# Patient Record
Sex: FEMALE | Race: WHITE | NOT HISPANIC OR LATINO | URBAN - METROPOLITAN AREA
[De-identification: names, ages, dates, MRNs, and addresses within clinical notes are randomized per-mention and may not be internally consistent; named-entity substitution may affect disease eponyms.]

---

## 2022-02-18 ENCOUNTER — OFFICE VISIT (OUTPATIENT)
Dept: INTERNAL MEDICINE | Facility: CLINIC | Age: 42
End: 2022-02-18

## 2022-02-18 VITALS
HEART RATE: 67 BPM | HEIGHT: 64 IN | BODY MASS INDEX: 20.96 KG/M2 | SYSTOLIC BLOOD PRESSURE: 111 MMHG | OXYGEN SATURATION: 99 % | WEIGHT: 122.8 LBS | TEMPERATURE: 99.6 F | DIASTOLIC BLOOD PRESSURE: 64 MMHG

## 2022-02-18 DIAGNOSIS — Z13.220 SCREENING, LIPID: ICD-10-CM

## 2022-02-18 DIAGNOSIS — Z00.00 ANNUAL PHYSICAL EXAM: Primary | ICD-10-CM

## 2022-02-18 DIAGNOSIS — K64.8 OTHER HEMORRHOIDS: ICD-10-CM

## 2022-02-18 DIAGNOSIS — E89.0 H/O PARTIAL THYROIDECTOMY: ICD-10-CM

## 2022-02-18 DIAGNOSIS — F41.9 ANXIETY: ICD-10-CM

## 2022-02-18 DIAGNOSIS — Z76.89 ENCOUNTER TO ESTABLISH CARE: ICD-10-CM

## 2022-02-18 DIAGNOSIS — G47.9 TROUBLE IN SLEEPING: ICD-10-CM

## 2022-02-18 PROCEDURE — 99386 PREV VISIT NEW AGE 40-64: CPT | Performed by: NURSE PRACTITIONER

## 2022-02-18 RX ORDER — MULTIPLE VITAMINS W/ MINERALS TAB 9MG-400MCG
1 TAB ORAL DAILY
COMMUNITY

## 2022-02-18 NOTE — PROGRESS NOTES
Date of Encounter: 2022  Patient: Ania Shea,  1980    Subjective     Chief complaint: Establish care, annual exam    HPI   Patient moved here recently from [place].  Patient states that she saw a provider in [place] 1 time but never went back.  Patient states that she has a history of partial thyroid removal.  Patient here today to establish care and have her annual physical.      Patient has made an OB/GYN appointment for her Pap smear with [facility] in .      Patient states that she has had some trouble sleeping through the night.  Has no trouble going to sleep but wakes up a couple times in the night.  Patient states she has a good sleep hygiene and tries to go to sleep at the same time every night.      Patient states she has a history of anxiety.  States it has been improved since moving here.  Currently not taking any medications for mood.      Review of Systems:  Negative for fever, congestion, chest pain upon exertion, shortness of breath, vision changes, vomiting, dysuria, lymphadenopathy, muscle weakness, numbness, mood changes, rashes.    The following portions of the patient's history were reviewed and updated as appropriate: allergies, current medications, past family history, past medical history, past social history, past surgical history and problem list.    Patient Active Problem List   Diagnosis   • H/O partial thyroidectomy   • Anxiety   • Other hemorrhoids     History reviewed. No pertinent past medical history.  Past Surgical History:   Procedure Laterality Date   • THYROID SURGERY     • WISDOM TOOTH EXTRACTION       Family History   Problem Relation Age of Onset   • No Known Problems Mother    • Hypertension Father        Current Outpatient Medications:   •  Calcium Carbonate-Vitamin D (CALCIUM-D PO), Take  by mouth., Disp: , Rfl:   •  multivitamin with minerals tablet tablet, Take 1 tablet by mouth Daily., Disp: , Rfl:   No Known Allergies  Social History     Tobacco Use  "  • Smoking status: Never Smoker   • Smokeless tobacco: Never Used   Vaping Use   • Vaping Use: Never used   Substance Use Topics   • Alcohol use: Yes   • Drug use: Defer          Objective   Physical Exam   Vitals:    02/18/22 1558   BP: 111/64   Pulse: 67   Temp: 99.6 °F (37.6 °C)   TempSrc: Temporal   SpO2: 99%   Weight: 55.7 kg (122 lb 12.8 oz)   Height: 162.6 cm (64\")     Body mass index is 21.08 kg/m².    Constitutional: NAD.  Eyes: EOMI. PERRLA. Normal conjunctiva.  Ear, nose, mouth, throat: No tonsillar exudates or erythema.   Normal nasal mucosa. Normal external ear canals and TMs bilaterally.  Cardiovascular: RRR. No murmurs. No LE edema b/l. Radial pulses 2+ bilaterally.  Pulmonary: CTA b/l. Good effort.  Integumentary: No rashes or wounds on face or upper extremities.  Lymphatic: No anterior cervical lymphadenopathy.  Endocrine: No thyromegaly or palpable thyroid nodules.  Psychiatric: Normal affect. Normal thought content.  Breasts: breasts appear normal, no suspicious masses, no skin or nipple changes or axillary nodes.         Assessment/Plan   Assessment and Plan  Pleasant 41-year-old female with history of anxiety, partial thyroidectomy and hemorrhoids here today to establish care and have annual visit.    Diagnoses and all orders for this visit:    1. Annual physical exam (Primary)  -     CBC & Differential  -     Comprehensive Metabolic Panel  -     Lipid Panel   We discussed preventative care including age and patient-appropriate immunizations and cancer screening. We also discussed the importance of exercise and a healthy diet. This is their annual preventative exam.    2. Encounter to establish care   Welcomed patient to practice. Discussed office policies. Reviewed patient reported medical history at bedside.    3. Screening, lipid  -     Lipid Panel    4. H/O partial thyroidectomy  -     CBC & Differential  -     Comprehensive Metabolic Panel  -     Thyroid Panel With TSH    5. Trouble in " sleeping   Discussed about good sleep hygiene.  Discussed about magnesium supplement.  Discussed about cutting back caffeine to 200 mg/day or less.    6. Anxiety   -Controlled.  We will continue to monitor.    7. Other hemorrhoids   -No issues at this time.  Discussed about avoiding aspirin.  Discussed about keeping a high-fiber diet and MiraLAX supplement.  Discussed about water intake.  Answered some questions from the patient.  We will hold on GI referral at this time.         Patient verbalized understanding of and agreed to plan of care.    Return in about 1 year (around 2/18/2023) for Annual physical.     Ellie Espino DNP, FNP-C  Family Medicine  O: [phone]      Please note that portions of this note were completed with a voice recognition program. Please call if questions.

## 2022-02-19 LAB
ALBUMIN SERPL-MCNC: 4.8 G/DL (ref 3.8–4.8)
ALBUMIN/GLOB SERPL: 1.8 {RATIO} (ref 1.2–2.2)
ALP SERPL-CCNC: 54 IU/L (ref 44–121)
ALT SERPL-CCNC: 44 IU/L (ref 0–32)
AST SERPL-CCNC: 40 IU/L (ref 0–40)
BASOPHILS # BLD AUTO: 0 X10E3/UL (ref 0–0.2)
BASOPHILS NFR BLD AUTO: 1 %
BILIRUB SERPL-MCNC: 0.6 MG/DL (ref 0–1.2)
BUN SERPL-MCNC: 19 MG/DL (ref 6–24)
BUN/CREAT SERPL: 22 (ref 9–23)
CALCIUM SERPL-MCNC: 9.6 MG/DL (ref 8.7–10.2)
CHLORIDE SERPL-SCNC: 104 MMOL/L (ref 96–106)
CHOLEST SERPL-MCNC: 165 MG/DL (ref 100–199)
CO2 SERPL-SCNC: 23 MMOL/L (ref 20–29)
CREAT SERPL-MCNC: 0.86 MG/DL (ref 0.57–1)
EOSINOPHIL # BLD AUTO: 0.1 X10E3/UL (ref 0–0.4)
EOSINOPHIL NFR BLD AUTO: 1 %
ERYTHROCYTE [DISTWIDTH] IN BLOOD BY AUTOMATED COUNT: 12.5 % (ref 11.7–15.4)
FT4I SERPL CALC-MCNC: 1.7 (ref 1.2–4.9)
GLOBULIN SER CALC-MCNC: 2.7 G/DL (ref 1.5–4.5)
GLUCOSE SERPL-MCNC: 96 MG/DL (ref 65–99)
HCT VFR BLD AUTO: 42.1 % (ref 34–46.6)
HDLC SERPL-MCNC: 79 MG/DL
HGB BLD-MCNC: 14.3 G/DL (ref 11.1–15.9)
IMM GRANULOCYTES # BLD AUTO: 0 X10E3/UL (ref 0–0.1)
IMM GRANULOCYTES NFR BLD AUTO: 0 %
LDLC SERPL CALC-MCNC: 74 MG/DL (ref 0–99)
LYMPHOCYTES # BLD AUTO: 1.6 X10E3/UL (ref 0.7–3.1)
LYMPHOCYTES NFR BLD AUTO: 27 %
MCH RBC QN AUTO: 31.4 PG (ref 26.6–33)
MCHC RBC AUTO-ENTMCNC: 34 G/DL (ref 31.5–35.7)
MCV RBC AUTO: 92 FL (ref 79–97)
MONOCYTES # BLD AUTO: 0.4 X10E3/UL (ref 0.1–0.9)
MONOCYTES NFR BLD AUTO: 8 %
NEUTROPHILS # BLD AUTO: 3.7 X10E3/UL (ref 1.4–7)
NEUTROPHILS NFR BLD AUTO: 63 %
PLATELET # BLD AUTO: 197 X10E3/UL (ref 150–450)
POTASSIUM SERPL-SCNC: 4.3 MMOL/L (ref 3.5–5.2)
PROT SERPL-MCNC: 7.5 G/DL (ref 6–8.5)
RBC # BLD AUTO: 4.56 X10E6/UL (ref 3.77–5.28)
SODIUM SERPL-SCNC: 145 MMOL/L (ref 134–144)
T3RU NFR SERPL: 26 % (ref 24–39)
T4 SERPL-MCNC: 6.5 UG/DL (ref 4.5–12)
TRIGL SERPL-MCNC: 58 MG/DL (ref 0–149)
TSH SERPL DL<=0.005 MIU/L-ACNC: 1.63 UIU/ML (ref 0.45–4.5)
VLDLC SERPL CALC-MCNC: 12 MG/DL (ref 5–40)
WBC # BLD AUTO: 5.8 X10E3/UL (ref 3.4–10.8)

## 2025-03-13 ENCOUNTER — OFFICE VISIT (OUTPATIENT)
Dept: INTERNAL MEDICINE | Facility: CLINIC | Age: 45
End: 2025-03-13
Payer: COMMERCIAL

## 2025-03-13 VITALS
BODY MASS INDEX: 25.68 KG/M2 | TEMPERATURE: 98.1 F | OXYGEN SATURATION: 98 % | HEIGHT: 64 IN | HEART RATE: 83 BPM | RESPIRATION RATE: 16 BRPM | WEIGHT: 150.4 LBS | SYSTOLIC BLOOD PRESSURE: 140 MMHG | DIASTOLIC BLOOD PRESSURE: 84 MMHG

## 2025-03-13 DIAGNOSIS — R53.83 OTHER FATIGUE: Primary | ICD-10-CM

## 2025-03-13 DIAGNOSIS — Z23 NEED FOR VACCINATION: ICD-10-CM

## 2025-03-13 DIAGNOSIS — F51.01 PRIMARY INSOMNIA: ICD-10-CM

## 2025-03-13 NOTE — PROGRESS NOTES
"Chief Complaint  Establish Care (Fatigue )    Subjective        Ania Shea presents to [facility]  History of Present Illness    Ms. Shea is a tariq 43 yo F who presents to establish care and discuss fatigue she has really been struggling with over the last 3-4 months.      has been travelling more over this time frame.  Oldest son is graduating.  She has 4 children.     Hurt her feet while running over the last 1 year.  Has pain in b/l feet after running.     Diagnosed with depression after her third child.  Exercise had always been a therapy for her.     Objective   Vital Signs:  /84 (BP Location: Left arm, Patient Position: Sitting, Cuff Size: Adult)   Pulse 83   Temp 98.1 °F (36.7 °C) (Infrared)   Resp 16   Ht 162.6 cm (64\")   Wt 68.2 kg (150 lb 6.4 oz)   SpO2 98%   BMI 25.82 kg/m²   Estimated body mass index is 25.82 kg/m² as calculated from the following:    Height as of this encounter: 162.6 cm (64\").    Weight as of this encounter: 68.2 kg (150 lb 6.4 oz).    BMI is >= 25 and <30. (Overweight) The following options were offered after discussion;: BMI is not accurate for all people.      Physical Exam  Vitals reviewed.   Constitutional:       General: She is not in acute distress.     Appearance: Normal appearance.   HENT:      Head: Normocephalic and atraumatic.      Nose: Nose normal.      Mouth/Throat:      Mouth: Mucous membranes are moist.   Eyes:      Conjunctiva/sclera: Conjunctivae normal.   Cardiovascular:      Rate and Rhythm: Normal rate and regular rhythm.      Pulses: Normal pulses.      Heart sounds: Normal heart sounds.   Pulmonary:      Effort: Pulmonary effort is normal.      Breath sounds: Normal breath sounds.   Abdominal:      Palpations: Abdomen is soft.      Tenderness: There is no abdominal tenderness.   Musculoskeletal:      Right lower leg: No edema.      Left lower leg: No edema.   Skin:     General: Skin is warm and dry. "   Neurological:      General: No focal deficit present.      Mental Status: She is alert.   Psychiatric:         Mood and Affect: Mood normal.        Result Review :           Assessment and Plan   Diagnoses and all orders for this visit:    1. Other fatigue (Primary)  -     Comprehensive Metabolic Panel  -     TSH  -     T4, free  -     CBC (No Diff)  -     Hemoglobin A1c    2. Need for vaccination  -     Tdap Vaccine Greater Than or Equal To 6yo IM    3. Primary insomnia  -     Follicle Stimulating Hormone      She is falling asleep fine, but is waking up intermittently in the middle of the night.      Fatigue is without SOA or CP.  Has not been able          Follow Up   Return in about 2 months (around 5/13/2025) for f/u labs, sleep, plantar fasciitis.  Patient was given instructions and counseling regarding her condition or for health maintenance advice. Please see specific information pulled into the AVS if appropriate.

## 2025-03-13 NOTE — PATIENT INSTRUCTIONS
This may be plantar fasciitis.  Try to increase mileage per week slowly.  Maybe only 10-15% per week.  You can do calf raises to strengthen calves.  You can do the frozen water bottle where you roll it with your foot.     Magnesium can be a good sleep aid.  You could try 400 mg nightly.  I usually recommend Magnesium Gluconate or Magnesium Citrate.

## 2025-03-14 LAB
ALBUMIN SERPL-MCNC: 4.7 G/DL (ref 3.9–4.9)
ALP SERPL-CCNC: 50 IU/L (ref 44–121)
ALT SERPL-CCNC: 13 IU/L (ref 0–32)
AST SERPL-CCNC: 22 IU/L (ref 0–40)
BILIRUB SERPL-MCNC: 0.3 MG/DL (ref 0–1.2)
BUN SERPL-MCNC: 19 MG/DL (ref 6–24)
BUN/CREAT SERPL: 22 (ref 9–23)
CALCIUM SERPL-MCNC: 9.3 MG/DL (ref 8.7–10.2)
CHLORIDE SERPL-SCNC: 102 MMOL/L (ref 96–106)
CO2 SERPL-SCNC: 21 MMOL/L (ref 20–29)
CREAT SERPL-MCNC: 0.86 MG/DL (ref 0.57–1)
EGFRCR SERPLBLD CKD-EPI 2021: 85 ML/MIN/1.73
ERYTHROCYTE [DISTWIDTH] IN BLOOD BY AUTOMATED COUNT: 12.2 % (ref 11.7–15.4)
FSH SERPL-ACNC: 3.5 MIU/ML
GLOBULIN SER CALC-MCNC: 2.8 G/DL (ref 1.5–4.5)
GLUCOSE SERPL-MCNC: 98 MG/DL (ref 70–99)
HBA1C MFR BLD: 5.3 % (ref 4.8–5.6)
HCT VFR BLD AUTO: 45.7 % (ref 34–46.6)
HGB BLD-MCNC: 15 G/DL (ref 11.1–15.9)
MCH RBC QN AUTO: 30.1 PG (ref 26.6–33)
MCHC RBC AUTO-ENTMCNC: 32.8 G/DL (ref 31.5–35.7)
MCV RBC AUTO: 92 FL (ref 79–97)
PLATELET # BLD AUTO: 208 X10E3/UL (ref 150–450)
POTASSIUM SERPL-SCNC: 4.2 MMOL/L (ref 3.5–5.2)
PROT SERPL-MCNC: 7.5 G/DL (ref 6–8.5)
RBC # BLD AUTO: 4.99 X10E6/UL (ref 3.77–5.28)
SODIUM SERPL-SCNC: 140 MMOL/L (ref 134–144)
T4 FREE SERPL-MCNC: 1.14 NG/DL (ref 0.82–1.77)
TSH SERPL DL<=0.005 MIU/L-ACNC: 1.53 UIU/ML (ref 0.45–4.5)
WBC # BLD AUTO: 7.5 X10E3/UL (ref 3.4–10.8)

## 2025-06-10 ENCOUNTER — OFFICE VISIT (OUTPATIENT)
Dept: INTERNAL MEDICINE | Facility: CLINIC | Age: 45
End: 2025-06-10
Payer: COMMERCIAL

## 2025-06-10 VITALS
SYSTOLIC BLOOD PRESSURE: 124 MMHG | HEART RATE: 85 BPM | WEIGHT: 151.6 LBS | RESPIRATION RATE: 16 BRPM | OXYGEN SATURATION: 95 % | BODY MASS INDEX: 25.88 KG/M2 | TEMPERATURE: 99.1 F | DIASTOLIC BLOOD PRESSURE: 82 MMHG | HEIGHT: 64 IN

## 2025-06-10 DIAGNOSIS — Z12.11 ENCOUNTER FOR SCREENING FOR MALIGNANT NEOPLASM OF COLON: ICD-10-CM

## 2025-06-10 DIAGNOSIS — Z00.00 ANNUAL PHYSICAL EXAM: Primary | ICD-10-CM

## 2025-06-10 DIAGNOSIS — R53.83 OTHER FATIGUE: ICD-10-CM

## 2025-06-10 DIAGNOSIS — R09.82 POST-NASAL DRIP: ICD-10-CM

## 2025-06-10 RX ORDER — CETIRIZINE HYDROCHLORIDE 10 MG/1
10 TABLET, CHEWABLE ORAL DAILY
COMMUNITY

## 2025-06-10 RX ORDER — DIPHENOXYLATE HYDROCHLORIDE AND ATROPINE SULFATE 2.5; .025 MG/1; MG/1
1 TABLET ORAL DAILY
COMMUNITY
End: 2025-06-10

## 2025-06-10 RX ORDER — FLUTICASONE PROPIONATE 50 MCG
2 SPRAY, SUSPENSION (ML) NASAL DAILY
COMMUNITY

## 2025-06-10 RX ORDER — MONTELUKAST SODIUM 10 MG/1
10 TABLET ORAL NIGHTLY
Qty: 90 TABLET | Refills: 1 | Status: SHIPPED | OUTPATIENT
Start: 2025-06-10

## 2025-06-10 RX ORDER — ERGOCALCIFEROL 1.25 MG/1
50000 CAPSULE, LIQUID FILLED ORAL WEEKLY
Qty: 12 CAPSULE | Refills: 1 | Status: SHIPPED | OUTPATIENT
Start: 2025-06-10

## 2025-06-10 RX ORDER — LANOLIN ALCOHOL/MO/W.PET/CERES
1000 CREAM (GRAM) TOPICAL DAILY
Qty: 90 TABLET | Refills: 1 | Status: SHIPPED | OUTPATIENT
Start: 2025-06-10

## 2025-06-10 NOTE — PROGRESS NOTES
Patient or patient representative verbalized consent for the use of Ambient Listening during the visit with  Sarina Salguero MD for chart documentation. 6/10/2025  13:45 EDT    Chief Complaint  Annual Exam (Not fasting / Singulair?)    Hazel Shea presents to [facility]    History of Present Illness  The patient presents for evaluation of fatigue, allergies, and health maintenance.    She reports persistent fatigue, which she attributes to her advancing age. Despite resuming her exercise regimen, she experiences a significant energy dip in the afternoon, necessitating a nap or early bedtime around 7:00 PM. This is a departure from her previous routine of staying up until 1:00 AM. She has been supplementing with calcium and vitamin D but remains uncertain about their efficacy. She also avoids sun exposure due to a family history of skin cancer.    She experienced a severe illness on Mother's Day, characterized by intense coughing and postnasal drip, which led to a 3-week hiatus from exercise. She continues to experience congestion and is uncertain if this is due to allergies. She was diagnosed with a sinus infection at an urgent care facility and prescribed antibiotics, which alleviated the pressure but did not completely resolve her symptoms. She attempted to exercise today but found it challenging due to her recent inactivity. She continues to experience postnasal drip and coughing. She has no history of asthma or allergies in [place] but reports severe allergies in [place] during field burning seasons. She uses Flonase daily without noticeable benefit and takes Zyrtec in the morning and Benadryl at night to aid sleep.    Her menstrual cycles remain regular, although they have never been consistently so.    She underwent a colonoscopy approximately 13 to 15 years ago due to heavy bleeding, which revealed internal hemorrhoids. A subsequent consultation confirmed  "the presence of hemorrhoids.    PAST SURGICAL HISTORY:  - Colonoscopy approximately 13 to 15 years ago due to heavy bleeding, which revealed internal hemorrhoids.    FAMILY HISTORY  Her grandfather and one of her aunts had skin cancer.  She has an aunt with colon cancer.       Objective   Vital Signs:  /82 (BP Location: Left arm, Patient Position: Sitting, Cuff Size: Adult)   Pulse 85   Temp 99.1 °F (37.3 °C) (Infrared)   Resp 16   Ht 162.6 cm (64\")   Wt 68.8 kg (151 lb 9.6 oz)   SpO2 95%   BMI 26.02 kg/m²   Estimated body mass index is 26.02 kg/m² as calculated from the following:    Height as of this encounter: 162.6 cm (64\").    Weight as of this encounter: 68.8 kg (151 lb 9.6 oz).            Physical Exam  Vitals reviewed.   Constitutional:       General: She is not in acute distress.     Appearance: Normal appearance.   HENT:      Head: Normocephalic and atraumatic.      Nose: Nose normal.      Mouth/Throat:      Mouth: Mucous membranes are moist.   Eyes:      Conjunctiva/sclera: Conjunctivae normal.   Cardiovascular:      Rate and Rhythm: Normal rate and regular rhythm.   Pulmonary:      Effort: Pulmonary effort is normal.      Breath sounds: Normal breath sounds.   Abdominal:      Palpations: Abdomen is soft.      Tenderness: There is no abdominal tenderness.   Musculoskeletal:      Right lower leg: No edema.      Left lower leg: No edema.   Skin:     General: Skin is warm and dry.   Neurological:      General: No focal deficit present.      Mental Status: She is alert.   Psychiatric:         Mood and Affect: Mood normal.          Physical Exam  Respiratory: Clear to auscultation, no wheezing, rales or rhonchi  Cardiovascular: Regular rate and rhythm, no murmurs, rubs, or gallops       Result Review :  The following data was reviewed by: Sarina Salguero MD on 06/10/2025:  Common labs          3/13/2025    11:34   Common Labs   Glucose 98    BUN 19    Creatinine 0.86    Sodium 140    Potassium 4.2  "   Chloride 102    Calcium 9.3    Albumin 4.7    Total Bilirubin 0.3    Alkaline Phosphatase 50    AST (SGOT) 22    ALT (SGPT) 13    WBC 7.5    Hemoglobin 15.0    Hematocrit 45.7    Platelets 208    Hemoglobin A1C 5.3      Data reviewed : no new data to review         Results  Labs   - FSH: 03/2025, Not in menopause          Assessment and Plan   Diagnoses and all orders for this visit:    1. Annual physical exam (Primary)    2. Encounter for screening for malignant neoplasm of colon  -     Cologuard - Stool, Per Rectum; Future    3. Post-nasal drip  -     montelukast (Singulair) 10 MG tablet; Take 1 tablet by mouth Every Night.  Dispense: 90 tablet; Refill: 1    4. Other fatigue  -     vitamin D (ERGOCALCIFEROL) 1.25 MG (55435 UT) capsule capsule; Take 1 capsule by mouth 1 (One) Time Per Week.  Dispense: 12 capsule; Refill: 1  -     vitamin B-12 (CYANOCOBALAMIN) 1000 MCG tablet; Take 1 tablet by mouth Daily.  Dispense: 90 tablet; Refill: 1        Assessment & Plan  1. Fatigue.  - Blood pressure readings are within the normal range today.  - Laboratory results from 03/2025 were unremarkable, indicating she is not currently in menopause.  - Reports feeling congested and experiencing postnasal drip, which may be contributing to her fatigue.  - Vitamin D and B12 supplements will be started. A sleep study may be considered if symptoms persist.    2. Allergies.  - Reports persistent congestion and postnasal drip despite using Flonase, Zyrtec, and Benadryl.  - Singulair (montelukast) will be prescribed for a duration of 30 days with two refills to manage her allergies.  - Potential side effects, including vivid dreams and possible mental health effects, were discussed.  - If any adverse effects occur, she should discontinue the medication immediately.    3. Health Maintenance.  - A Cologuard test will be ordered for colon cancer screening due to her family history of colon cancer and previous findings of internal  hemorrhoids.    Follow-up  - Follow up in 8 weeks.            Follow Up   Return in about 8 weeks (around 8/5/2025) for f/u fatigue.  Patient was given instructions and counseling regarding her condition or for health maintenance advice. Please see specific information pulled into the AVS if appropriate.